# Patient Record
Sex: FEMALE | Race: WHITE | NOT HISPANIC OR LATINO | URBAN - METROPOLITAN AREA
[De-identification: names, ages, dates, MRNs, and addresses within clinical notes are randomized per-mention and may not be internally consistent; named-entity substitution may affect disease eponyms.]

---

## 2020-10-13 ENCOUNTER — TELEPHONE ENCOUNTER (OUTPATIENT)
Dept: URBAN - METROPOLITAN AREA CLINIC 92 | Facility: CLINIC | Age: 60
End: 2020-10-13

## 2020-10-13 NOTE — HPI-TODAY'S VISIT:
Dear Nat Stewart,  Oct mri:  Diffuse fatty infiltration of the liver seen without cirrhosis, portal hypertension or suspicious hepatic lesions. Specifically re the liver: A 0.7 x 0.9cm arterially enhancing focus in segment VII is similar to prior and most likely perfusional and will need redo scan in 6m. Calculated liver fat percentage is 18.49% and desired <6%. Regarding the gallbladder, Layering T2 hyperintense material within the gallbladder most likely represents biliary sludge. No inflammatory findings to suggest acute cholecystitis. Spleen and pancreas normal. renal cysts seen.  April mri with 22-23% fat and perfusional change seen on that scan also.   Dr Katz

## 2020-10-20 ENCOUNTER — TELEPHONE ENCOUNTER (OUTPATIENT)
Dept: URBAN - METROPOLITAN AREA CLINIC 92 | Facility: CLINIC | Age: 60
End: 2020-10-20

## 2020-10-20 ENCOUNTER — OFFICE VISIT (OUTPATIENT)
Dept: URBAN - METROPOLITAN AREA CLINIC 86 | Facility: CLINIC | Age: 60
End: 2020-10-20

## 2020-10-20 RX ORDER — ESCITALOPRAM 10 MG/1
TAKE 1 TABLET (10 MG) BY ORAL ROUTE ONCE DAILY TABLET, FILM COATED ORAL 1
Qty: 0 | Refills: 0 | Status: ACTIVE | COMMUNITY
Start: 1900-01-01 | End: 1900-01-01

## 2020-10-20 RX ORDER — FLUTICASONE PROPIONATE 50 UG/1
SPRAY 1 - 2 SPRAYS (50 - 100 MCG) IN EACH NOSTRIL BY INTRANASAL ROUTE ONCE DAILY AS NEEDED SPRAY, METERED NASAL 1
Qty: 1 | Refills: 0 | Status: ACTIVE | COMMUNITY
Start: 1900-01-01 | End: 1900-01-01

## 2020-10-20 RX ORDER — AZELASTINE 137 UG/1
SPRAY 2 SPRAYS IN EACH NOSTRIL BY INTRANASAL ROUTE 2 TIMES PER DAY AS DIRECTED SPRAY, METERED NASAL 2
Qty: 1 | Refills: 0 | Status: ACTIVE | COMMUNITY
Start: 1900-01-01 | End: 1900-01-01

## 2020-10-20 NOTE — HPI-OTHER HISTORIES
10/9/20 labs Gluc 110 Cr 0.78 Potassium 4.7 Tb 0.9  Alp 82 Ast 68 Alt elevated 93 Wbc 5.1 Hgb 12.7 Plt 204 Hemoglobin a1c 5.9%

## 2020-10-20 NOTE — HPI-TODAY'S VISIT:
The patient is a 59 year old /White female, who presents on referral from Mervat Price NP, for a gastroenterology evaluation for elevated liver enzymes and and fatty appearing and enlarged. A copy of this document will be sent to the referring provider.  The patient reports a personal history of no other habits that could cause liver damage.   10/13/20 mri:   * Final Report *  Reason For Exam JASSO  REPORT EXAM: MRI Abdomen w/ + w/o Contrast  CLINICAL INDICATION: JASSO. FATTY LIVER  TECHNIQUE: Multisequence, multiplanar MRI of the abdomen was performed without and with intravenous contrast. ESRC.2.7.3  CONTRAST: 14 cc of Prohance  COMPARISON: MR abdomen dated 4/1/2020  FINDINGS:  Lower Thorax: Normal.  Liver: Fat deposition. A 0.7 x 0.9cm arterially enhancing focus in segment VII is similar to prior and most likely perfusional (22:39).  Calculated fat percentage is 18.49%. R2 water is 29.08.  Gallbladder/Biliary Tree: Layering T2 hyperintense material within the gallbladder most likely represents biliary sludge. No inflammatory findings to suggest acute cholecystitis.  Spleen: Normal.  Pancreas: Normal.  Adrenal Glands: Normal.   Kidneys/Ureters: Renal cysts.  Gastrointestinal: Normal.   Lymph Nodes: Normal.  Vessels: Normal.  Peritoneum/Retroperitoneum: Normal.  Bones/Soft Tissues: Normal.  IMPRESSION:     Diffuse fatty infiltration of the liver without cirrhosis, portal hypertension or suspicious hepatic lesions.  The images were reviewed and interpreted by Emely Doty MD.    RECAP: Document Type: MRI Abdomen w/ + w/o Contrast Document Date: April 01, 2020 12:08  Document Status: Modified Document Title: MRI Abdomen w/ + w/o Contrast Performed By: Thomas Phipps  Verified By: Shakila Rodriguez on April 01, 2020 14:52  Encounter info: [id number], [facility], Single Visit OP, 4/1/2020 - 4/1/2020   * Final Report *  Reason For Exam hepatomegaly  REPORT EXAM: MRI Abdomen w/ + w/o Contrast  CLINICAL INDICATION: hepatomegaly.   TECHNIQUE: Multisequence, multiplanar MRI of the abdomen was performed without and with intravenous contrast. ESRC.2.7.3  CONTRAST: 14 cc of Prohance  COMPARISON: None  FINDINGS:  Lower Thorax: Normal.  Liver: Mild hepatomegaly. Hepatic steatosis with calculated fat signal fraction of 22.05-23.04%.  Small area of arterial enhancement in the posterior right hepatic lobe without correlate on other sequences is likely perfusional. No suspicious hepatic lesion.  Gallbladder/Biliary Tree: Gallbladder sludge or concentrated bile.  Spleen: Normal.  Pancreas: Normal.  Adrenal Glands: Normal.   Kidneys/Ureters: Renal cysts.  Gastrointestinal: Normal.   Lymph Nodes: Normal.  Vessels: Normal.  Peritoneum/Retroperitoneum: Normal.  Bones/Soft Tissues: Normal.  IMPRESSION:     Subjective mild hepatomegaly. Hepatic steatosis with calculated fat signal fraction of 22.05-23.04%. No suspicious hepatic lesion. No imaging findings of cirrhosis or portal venous hypertension.   The images were reviewed and interpreted by Shakila Rodriguez MD.  Signature Line *** Final ***  Electronically Signed By:  Shakila Rodriguez on  04/01/2020 14:52  Dictated by:  Thomas Phipps  3-10-20 glu 129 and cr 0.94 and na 142 and k 3.7 and cl 102 and co2 21 and ca 10.3 little up and alb 4.8 and tb 0.7 and alk 91 and ast 104 and alt 120  may want to mention to doctor re the calcium.  pt here for follow up for abnormal lfts and fatty liver.    She did her last mri at [facility] and areas of fatty sparing areas noted.   Prior started cbd oil 2 months prior and avoided.  Advised her that she can take apap but no more than 2gm daily.  She still has about 2 glasess of wine per month.   Oct 2019 gluc 100 ast 124 alt 137  Oct 2019 mri moderate fatty infiltration noted on liver. fatty sparing areas noted.    Recap:  She stopped meloxicam prior.  8/28/19 labs gluc 135 cr 0.77 alp 93 ast 75 alt 118 iron sat 24% alpha 1 MM need hep b vaccine and she has not been able to do yet. asma 6 camilo neg ceruloplamin 27.7 ferritin 136 hep b core ab neg need hep a vaccine also needed at some point. hep b core ab igm neg   She has not seen anyone else.   7-3-19 u/s: aorta and ivc not well seen. there was fatty infiltration of the liver seen and liver enlarged at 18cm. Pancreas not well seen and right kidney appears normal.  No gb wall thickening or pericholecystic fluid. cbd 5mm.  Pt bmi 27.3 with primary md and she has lost 7 pounds.  Amitryptiline those are old. Doxycycline was a short course for the ear. mirtazepine for 6m. The labs and their slow drop speak to fatty liver.  She has a hx mixed anx and depressive disorder and on meds for this.  June 2019 labs: tsh 3.21, chol 267 and trg 131 and hdl 46 and ldl 195 and glu 116 and cr 0.84 and na 143 and k 4.5 and cl 101 and co2 26 and ca 10.4 and alb 4.7 and tb 1.0 and alk 84 and ast 61 and alt 121 and wbc 6.7 and hg 13 and plat 214.  jan 2019 wbc 5.3 hg 12.6 and plat 215 and glu 110 and cr 0.95 and na 145 and k 4.5 and tb 0.9 and alk 85 and ast 62 and alt 81 and chol 188 abd trg 128 and ldl 121 and a1c 5.95.

## 2020-10-20 NOTE — HPI-OTHER HISTORIES
10/13/20 labs Gluc 110 Cr 0.78 Potassium 4.7 Tb 0.9  Alp 82 Ast 68 Alt elevated 93 Wbc 5.1 Hgb 12.7 Plt 204 Hemoglobin a1c 5.9%

## 2020-11-04 ENCOUNTER — LAB OUTSIDE AN ENCOUNTER (OUTPATIENT)
Dept: URBAN - METROPOLITAN AREA TELEHEALTH 2 | Facility: TELEHEALTH | Age: 60
End: 2020-11-04

## 2020-11-04 ENCOUNTER — OFFICE VISIT (OUTPATIENT)
Dept: URBAN - METROPOLITAN AREA TELEHEALTH 2 | Facility: TELEHEALTH | Age: 60
End: 2020-11-04
Payer: COMMERCIAL

## 2020-11-04 DIAGNOSIS — R16.0 HEPATOMEGALY: ICD-10-CM

## 2020-11-04 DIAGNOSIS — R73.09 ELEVATED HEMOGLOBIN A1C: ICD-10-CM

## 2020-11-04 DIAGNOSIS — E03.9 HYPOTHYROIDISM: ICD-10-CM

## 2020-11-04 DIAGNOSIS — F41.8 ANXIETY AND DEPRESSION: ICD-10-CM

## 2020-11-04 DIAGNOSIS — K76.0 FATTY LIVER: ICD-10-CM

## 2020-11-04 DIAGNOSIS — K75.81 NASH (NONALCOHOLIC STEATOHEPATITIS): ICD-10-CM

## 2020-11-04 DIAGNOSIS — K76.89 LIVER LESION: ICD-10-CM

## 2020-11-04 DIAGNOSIS — J30.1 ALLERGIC RHINITIS DUE TO POLLEN: ICD-10-CM

## 2020-11-04 DIAGNOSIS — I10 HTN (HYPERTENSION): ICD-10-CM

## 2020-11-04 DIAGNOSIS — E66.3 OVERWEIGHT: ICD-10-CM

## 2020-11-04 DIAGNOSIS — E78.5 HYPERLIPIDEMIA: ICD-10-CM

## 2020-11-04 DIAGNOSIS — G25.81 RESTLESS LEGS SYNDROME (RLS): ICD-10-CM

## 2020-11-04 PROCEDURE — G8427 DOCREV CUR MEDS BY ELIG CLIN: HCPCS

## 2020-11-04 PROCEDURE — 99214 OFFICE O/P EST MOD 30 MIN: CPT

## 2020-11-04 PROCEDURE — G8417 CALC BMI ABV UP PARAM F/U: HCPCS

## 2020-11-04 RX ORDER — ESCITALOPRAM 10 MG/1
TAKE 1 TABLET (10 MG) BY ORAL ROUTE ONCE DAILY TABLET, FILM COATED ORAL 1
Qty: 0 | Refills: 0 | Status: ON HOLD | COMMUNITY
Start: 1900-01-01

## 2020-11-04 RX ORDER — PAROXETINE HCL 10 MG
1 TABLET IN THE MORNING TABLET ORAL ONCE A DAY
Status: ACTIVE | COMMUNITY

## 2020-11-04 RX ORDER — METFORMIN HYDROCHLORIDE 500 MG/1
1 TABLET WITH A MEAL TABLET, FILM COATED ORAL ONCE A DAY
Status: ACTIVE | COMMUNITY

## 2020-11-04 RX ORDER — FLUTICASONE PROPIONATE 50 UG/1
SPRAY 1 - 2 SPRAYS (50 - 100 MCG) IN EACH NOSTRIL BY INTRANASAL ROUTE ONCE DAILY AS NEEDED SPRAY, METERED NASAL 1
Qty: 1 | Refills: 0 | Status: ACTIVE | COMMUNITY
Start: 1900-01-01

## 2020-11-04 RX ORDER — AZELASTINE 137 UG/1
SPRAY 2 SPRAYS IN EACH NOSTRIL BY INTRANASAL ROUTE 2 TIMES PER DAY AS DIRECTED SPRAY, METERED NASAL 2
Qty: 1 | Refills: 0 | Status: ACTIVE | COMMUNITY
Start: 1900-01-01

## 2020-11-04 NOTE — HPI-TODAY'S VISIT:
The patient is a 60 year old /White female, who presents on referral from Mervat Price NP, for a gastroenterology evaluation for elevated liver enzymes and and fatty appearing and enlarged. A copy of this document will be sent to the referring provider.   The patient reports a personal history of no other habits that could cause liver damage.   pt has been working on her weight.  was started on metformin a few months ago and gluc has improved as well as fat quant on mri and lfts.  she is continuing to work on this.   10/9/20 labs  Cr 0.78 Gluc 110 Alp 82 Ast 68 Alt 93 Wbc 5.1 Hgb 12.7 Plt 204   10/13/20 mri:   * Final Report *  Reason For Exam JASSO  REPORT EXAM: MRI Abdomen w/ + w/o Contrast  CLINICAL INDICATION: JASSO. FATTY LIVER  TECHNIQUE: Multisequence, multiplanar MRI of the abdomen was performed without and with intravenous contrast. ESRC.2.7.3  CONTRAST: 14 cc of Prohance  COMPARISON: MR abdomen dated 4/1/2020  FINDINGS:  Lower Thorax: Normal.  Liver: Fat deposition. A 0.7 x 0.9cm arterially enhancing focus in segment VII is similar to prior and most likely perfusional (22:39).  Calculated fat percentage is 18.49%. R2 water is 29.08.  Gallbladder/Biliary Tree: Layering T2 hyperintense material within the gallbladder most likely represents biliary sludge. No inflammatory findings to suggest acute cholecystitis.  Spleen: Normal.  Pancreas: Normal.  Adrenal Glands: Normal.   Kidneys/Ureters: Renal cysts.  Gastrointestinal: Normal.   Lymph Nodes: Normal.  Vessels: Normal.  Peritoneum/Retroperitoneum: Normal.  Bones/Soft Tissues: Normal.  IMPRESSION:     Diffuse fatty infiltration of the liver without cirrhosis, portal hypertension or suspicious hepatic lesions.  The images were reviewed and interpreted by Emely Doty MD.   PREVIOUS NOTE: Document Type: MRI Abdomen w/ + w/o Contrast Document Date: April 01, 2020 12:08  Document Status: Modified Document Title: MRI Abdomen w/ + w/o Contrast Performed By: Thomas Phipps  Verified By: Shakila Rodriguez on April 01, 2020 14:52  Encounter info: [id number], [facility], Single Visit OP, 4/1/2020 - 4/1/2020   * Final Report *  Reason For Exam hepatomegaly  REPORT EXAM: MRI Abdomen w/ + w/o Contrast  CLINICAL INDICATION: hepatomegaly.   TECHNIQUE: Multisequence, multiplanar MRI of the abdomen was performed without and with intravenous contrast. ESRC.2.7.3  CONTRAST: 14 cc of Prohance  COMPARISON: None  FINDINGS:  Lower Thorax: Normal.  Liver: Mild hepatomegaly. Hepatic steatosis with calculated fat signal fraction of 22.05-23.04%.  Small area of arterial enhancement in the posterior right hepatic lobe without correlate on other sequences is likely perfusional. No suspicious hepatic lesion.  Gallbladder/Biliary Tree: Gallbladder sludge or concentrated bile.  Spleen: Normal.  Pancreas: Normal.  Adrenal Glands: Normal.   Kidneys/Ureters: Renal cysts.  Gastrointestinal: Normal.   Lymph Nodes: Normal.  Vessels: Normal.  Peritoneum/Retroperitoneum: Normal.  Bones/Soft Tissues: Normal.  IMPRESSION:     Subjective mild hepatomegaly. Hepatic steatosis with calculated fat signal fraction of 22.05-23.04%. No suspicious hepatic lesion. No imaging findings of cirrhosis or portal venous hypertension.   The images were reviewed and interpreted by Shakila Rodriguez MD.  Signature Line *** Final ***  Electronically Signed By:  Shakila Rodriguez on  04/01/2020 14:52  Dictated by:  Thomas Phipps I  3-10-20 glu 129 and cr 0.94 and na 142 and k 3.7 and cl 102 and co2 21 and ca 10.3 little up and alb 4.8 and tb 0.7 and alk 91 and ast 104 and alt 120

## 2021-03-31 ENCOUNTER — TELEPHONE ENCOUNTER (OUTPATIENT)
Dept: URBAN - METROPOLITAN AREA CLINIC 92 | Facility: CLINIC | Age: 61
End: 2021-03-31

## 2021-03-31 NOTE — HPI-TODAY'S VISIT:
Deasarah Stewart,  March 30 mri: Hepatic steatosis (fatty liver) with a calculated fat percentage of approximately 23% and desired less than 6%. No morphologic findings of cirrhosis. No suspicious hepatic lesions. Scattered tiny foci of arterial enhancement, without correlate on other sequences, most likely perfusional.  Small gallbladder sludge, without cholelithiasis or findings of acute cholecystitis. Spleen and pancreas and kidneys normal.  Dr Katz

## 2021-04-14 ENCOUNTER — OFFICE VISIT (OUTPATIENT)
Dept: URBAN - METROPOLITAN AREA TELEHEALTH 2 | Facility: TELEHEALTH | Age: 61
End: 2021-04-14

## 2021-04-14 RX ORDER — FLUTICASONE PROPIONATE 50 UG/1
SPRAY 1 - 2 SPRAYS (50 - 100 MCG) IN EACH NOSTRIL BY INTRANASAL ROUTE ONCE DAILY AS NEEDED SPRAY, METERED NASAL 1
Qty: 1 | Refills: 0 | Status: ACTIVE | COMMUNITY
Start: 1900-01-01

## 2021-04-14 RX ORDER — METFORMIN HYDROCHLORIDE 500 MG/1
1 TABLET WITH A MEAL TABLET, FILM COATED ORAL ONCE A DAY
Status: ACTIVE | COMMUNITY

## 2021-04-14 RX ORDER — PAROXETINE HCL 10 MG
1 TABLET IN THE MORNING TABLET ORAL ONCE A DAY
Status: ACTIVE | COMMUNITY

## 2021-04-14 RX ORDER — AZELASTINE 137 UG/1
SPRAY 2 SPRAYS IN EACH NOSTRIL BY INTRANASAL ROUTE 2 TIMES PER DAY AS DIRECTED SPRAY, METERED NASAL 2
Qty: 1 | Refills: 0 | Status: ACTIVE | COMMUNITY
Start: 1900-01-01

## 2021-04-14 RX ORDER — ESCITALOPRAM 10 MG/1
TAKE 1 TABLET (10 MG) BY ORAL ROUTE ONCE DAILY TABLET, FILM COATED ORAL 1
Qty: 0 | Refills: 0 | Status: ON HOLD | COMMUNITY
Start: 1900-01-01

## 2021-04-14 NOTE — HPI-TODAY'S VISIT:
RESCHEDULING APPT UNTIL AFTER LABS ARE DONE WITH PCP SO WE HAVE ALL INFO PRIOR TO APPT   March 30 mri: Hepatic steatosis (fatty liver) with a calculated fat percentage of approximately 23% and desired less than 6%. No morphologic findings of cirrhosis. No suspicious hepatic lesions. Scattered tiny foci of arterial enhancement, without correlate on other sequences, most likely perfusional.  Small gallbladder sludge, without cholelithiasis or findings of acute cholecystitis. Spleen and pancreas and kidneys normal.  Dr Katz The patient is a 60 year old /White female, who presents on referral from Mervat Price NP, for a gastroenterology evaluation for elevated liver enzymes and and fatty appearing and enlarged. A copy of this document will be sent to the referring provider.   The patient reports a personal history of no other habits that could cause liver damage.   pt has been working on her weight.  was started on metformin a few months ago and gluc has improved as well as fat quant on mri and lfts.  she is continuing to work on this.   10/9/20 labs  Cr 0.78 Gluc 110 Alp 82 Ast 68 Alt 93 Wbc 5.1 Hgb 12.7 Plt 204   10/13/20 mri:   * Final Report *  Reason For Exam JASSO  REPORT EXAM: MRI Abdomen w/ + w/o Contrast  CLINICAL INDICATION: JASSO. FATTY LIVER  TECHNIQUE: Multisequence, multiplanar MRI of the abdomen was performed without and with intravenous contrast. ESRC.2.7.3  CONTRAST: 14 cc of Prohance  COMPARISON: MR abdomen dated 4/1/2020  FINDINGS:  Lower Thorax: Normal.  Liver: Fat deposition. A 0.7 x 0.9cm arterially enhancing focus in segment VII is similar to prior and most likely perfusional (22:39).  Calculated fat percentage is 18.49%. R2 water is 29.08.  Gallbladder/Biliary Tree: Layering T2 hyperintense material within the gallbladder most likely represents biliary sludge. No inflammatory findings to suggest acute cholecystitis.  Spleen: Normal.  Pancreas: Normal.  Adrenal Glands: Normal.   Kidneys/Ureters: Renal cysts.  Gastrointestinal: Normal.   Lymph Nodes: Normal.  Vessels: Normal.  Peritoneum/Retroperitoneum: Normal.  Bones/Soft Tissues: Normal.  IMPRESSION:     Diffuse fatty infiltration of the liver without cirrhosis, portal hypertension or suspicious hepatic lesions.  The images were reviewed and interpreted by Emely Doty MD.   PREVIOUS NOTE: Document Type: MRI Abdomen w/ + w/o Contrast Document Date: April 01, 2020 12:08  Document Status: Modified Document Title: MRI Abdomen w/ + w/o Contrast Performed By: Thomas Phipps  Verified By: Shakila Rodriguez on April 01, 2020 14:52  Encounter info: [id number], [facility], Single Visit OP, 4/1/2020 - 4/1/2020   * Final Report *  Reason For Exam hepatomegaly  REPORT EXAM: MRI Abdomen w/ + w/o Contrast  CLINICAL INDICATION: hepatomegaly.   TECHNIQUE: Multisequence, multiplanar MRI of the abdomen was performed without and with intravenous contrast. ESRC.2.7.3  CONTRAST: 14 cc of Prohance  COMPARISON: None  FINDINGS:  Lower Thorax: Normal.  Liver: Mild hepatomegaly. Hepatic steatosis with calculated fat signal fraction of 22.05-23.04%.  Small area of arterial enhancement in the posterior right hepatic lobe without correlate on other sequences is likely perfusional. No suspicious hepatic lesion.  Gallbladder/Biliary Tree: Gallbladder sludge or concentrated bile.  Spleen: Normal.  Pancreas: Normal.  Adrenal Glands: Normal.   Kidneys/Ureters: Renal cysts.  Gastrointestinal: Normal.   Lymph Nodes: Normal.  Vessels: Normal.  Peritoneum/Retroperitoneum: Normal.  Bones/Soft Tissues: Normal.  IMPRESSION:     Subjective mild hepatomegaly. Hepatic steatosis with calculated fat signal fraction of 22.05-23.04%. No suspicious hepatic lesion. No imaging findings of cirrhosis or portal venous hypertension.   The images were reviewed and interpreted by Shakila Rodriguez MD.  Signature Line *** Final ***  Electronically Signed By:  Shakila Rodriguez on  04/01/2020 14:52  Dictated by:  Thomas Phipps  3-10-20 glu 129 and cr 0.94 and na 142 and k 3.7 and cl 102 and co2 21 and ca 10.3 little up and alb 4.8 and tb 0.7 and alk 91 and ast 104 and alt 120

## 2021-05-20 ENCOUNTER — OFFICE VISIT (OUTPATIENT)
Dept: URBAN - METROPOLITAN AREA TELEHEALTH 2 | Facility: TELEHEALTH | Age: 61
End: 2021-05-20

## 2021-06-28 PROBLEM — 300331000 LESION OF LIVER: Status: ACTIVE | Noted: 2020-11-04

## 2021-06-29 ENCOUNTER — OFFICE VISIT (OUTPATIENT)
Dept: URBAN - METROPOLITAN AREA CLINIC 86 | Facility: CLINIC | Age: 61
End: 2021-06-29

## 2021-06-29 RX ORDER — AZELASTINE 137 UG/1
SPRAY 2 SPRAYS IN EACH NOSTRIL BY INTRANASAL ROUTE 2 TIMES PER DAY AS DIRECTED SPRAY, METERED NASAL 2
Qty: 1 | Refills: 0 | Status: ACTIVE | COMMUNITY
Start: 1900-01-01

## 2021-06-29 RX ORDER — METFORMIN HYDROCHLORIDE 500 MG/1
1 TABLET WITH A MEAL TABLET, FILM COATED ORAL ONCE A DAY
Status: ACTIVE | COMMUNITY

## 2021-06-29 RX ORDER — ESCITALOPRAM 10 MG/1
TAKE 1 TABLET (10 MG) BY ORAL ROUTE ONCE DAILY TABLET, FILM COATED ORAL 1
Qty: 0 | Refills: 0 | COMMUNITY
Start: 1900-01-01

## 2021-06-29 RX ORDER — FLUTICASONE PROPIONATE 50 UG/1
SPRAY 1 - 2 SPRAYS (50 - 100 MCG) IN EACH NOSTRIL BY INTRANASAL ROUTE ONCE DAILY AS NEEDED SPRAY, METERED NASAL 1
Qty: 1 | Refills: 0 | Status: ACTIVE | COMMUNITY
Start: 1900-01-01

## 2021-06-29 RX ORDER — PAROXETINE HCL 10 MG
1 TABLET IN THE MORNING TABLET ORAL ONCE A DAY
Status: ACTIVE | COMMUNITY

## 2021-06-29 NOTE — HPI-TODAY'S VISIT:
The patient is a 60 year old /White female, who presents on referral from Mervat Price NP, for a gastroenterology evaluation for elevated liver enzymes and and fatty appearing and enlarged. A copy of this document will be sent to the referring provider.  The patient reports a personal history of no other habits that could cause liver damage.   Labs-   March 30 mri: Hepatic steatosis (fatty liver) with a calculated fat percentage of approximately 23% and desired less than 6%. No morphologic findings of cirrhosis. No suspicious hepatic lesions. Scattered tiny foci of arterial enhancement, without correlate on other sequences, most likely perfusional.  Small gallbladder sludge, without cholelithiasis or findings of acute cholecystitis. Spleen and pancreas and kidneys normal.     pt has been working on her weight.  was started on metformin a few months ago and gluc has improved as well as fat quant on mri and lfts.  she is continuing to work on this.   10/9/20 labs  Cr 0.78 Gluc 110 Alp 82 Ast 68 Alt 93 Wbc 5.1 Hgb 12.7 Plt 204 Hemoglobin a1c 5.9%   10/13/20 mri * Final Report *  Reason For Exam JASSO  REPORT EXAM: MRI Abdomen w/ + w/o Contrast  CLINICAL INDICATION: JASSO. FATTY LIVER  TECHNIQUE: Multisequence, multiplanar MRI of the abdomen was performed without and with intravenous contrast. ESRC.2.7.3  CONTRAST: 14 cc of Prohance  COMPARISON: MR abdomen dated 4/1/2020  FINDINGS:  Lower Thorax: Normal.  Liver: Fat deposition. A 0.7 x 0.9cm arterially enhancing focus in segment VII is similar to prior and most likely perfusional (22:39).  Calculated fat percentage is 18.49%. R2 water is 29.08.  Gallbladder/Biliary Tree: Layering T2 hyperintense material within the gallbladder most likely represents biliary sludge. No inflammatory findings to suggest acute cholecystitis.  Spleen: Normal.  Pancreas: Normal.  Adrenal Glands: Normal.   Kidneys/Ureters: Renal cysts.  Gastrointestinal: Normal.   Lymph Nodes: Normal.  Vessels: Normal.  Peritoneum/Retroperitoneum: Normal.  Bones/Soft Tissues: Normal.  IMPRESSION:     Diffuse fatty infiltration of the liver without cirrhosis, portal hypertension or suspicious hepatic lesions.  The images were reviewed and interpreted by Emely Doty MD.   PREVIOUS NOTE: Document Type: MRI Abdomen w/ + w/o Contrast Document Date: April 01, 2020 12:08  Document Status: Modified Document Title: MRI Abdomen w/ + w/o Contrast Performed By: Thomas Phipps  Verified By: Shakila Rodriguez on April 01, 2020 14:52  Encounter info: [id number], [facility], Single Visit OP, 4/1/2020 - 4/1/2020   * Final Report *  Reason For Exam hepatomegaly  REPORT EXAM: MRI Abdomen w/ + w/o Contrast  CLINICAL INDICATION: hepatomegaly.   TECHNIQUE: Multisequence, multiplanar MRI of the abdomen was performed without and with intravenous contrast. ESRC.2.7.3  CONTRAST: 14 cc of Prohance  COMPARISON: None  FINDINGS:  Lower Thorax: Normal.  Liver: Mild hepatomegaly. Hepatic steatosis with calculated fat signal fraction of 22.05-23.04%.  Small area of arterial enhancement in the posterior right hepatic lobe without correlate on other sequences is likely perfusional. No suspicious hepatic lesion.  Gallbladder/Biliary Tree: Gallbladder sludge or concentrated bile.  Spleen: Normal.  Pancreas: Normal.  Adrenal Glands: Normal.   Kidneys/Ureters: Renal cysts.  Gastrointestinal: Normal.   Lymph Nodes: Normal.  Vessels: Normal.  Peritoneum/Retroperitoneum: Normal.  Bones/Soft Tissues: Normal.  IMPRESSION:     Subjective mild hepatomegaly. Hepatic steatosis with calculated fat signal fraction of 22.05-23.04%. No suspicious hepatic lesion. No imaging findings of cirrhosis or portal venous hypertension.   The images were reviewed and interpreted by Shakila Rodriguez MD.  Signature Line *** Final ***  Electronically Signed By:  Shakila Rodriguez on  04/01/2020 14:52  Dictated by:  Thomas Phipps  3-10-20 glu 129 and cr 0.94 and na 142 and k 3.7 and cl 102 and co2 21 and ca 10.3 little up and alb 4.8 and tb 0.7 and alk 91 and ast 104 and alt 120

## 2021-09-07 ENCOUNTER — LAB OUTSIDE AN ENCOUNTER (OUTPATIENT)
Dept: URBAN - METROPOLITAN AREA CLINIC 70 | Facility: CLINIC | Age: 61
End: 2021-09-07

## 2021-09-07 ENCOUNTER — WEB ENCOUNTER (OUTPATIENT)
Dept: URBAN - METROPOLITAN AREA CLINIC 70 | Facility: CLINIC | Age: 61
End: 2021-09-07

## 2021-09-07 ENCOUNTER — OFFICE VISIT (OUTPATIENT)
Dept: URBAN - METROPOLITAN AREA CLINIC 70 | Facility: CLINIC | Age: 61
End: 2021-09-07
Payer: COMMERCIAL

## 2021-09-07 ENCOUNTER — DASHBOARD ENCOUNTERS (OUTPATIENT)
Age: 61
End: 2021-09-07

## 2021-09-07 DIAGNOSIS — K59.04 CHRONIC IDIOPATHIC CONSTIPATION: ICD-10-CM

## 2021-09-07 DIAGNOSIS — R79.89 ELEVATED LFTS: ICD-10-CM

## 2021-09-07 DIAGNOSIS — K76.0 FATTY LIVER: ICD-10-CM

## 2021-09-07 DIAGNOSIS — Z12.11 COLON CANCER SCREENING: ICD-10-CM

## 2021-09-07 PROBLEM — 82934008: Status: ACTIVE | Noted: 2021-09-07

## 2021-09-07 PROCEDURE — 99214 OFFICE O/P EST MOD 30 MIN: CPT | Performed by: INTERNAL MEDICINE

## 2021-09-07 RX ORDER — ESCITALOPRAM 10 MG/1
TAKE 1 TABLET (10 MG) BY ORAL ROUTE ONCE DAILY TABLET, FILM COATED ORAL 1
Qty: 0 | Refills: 0 | Status: ACTIVE | COMMUNITY
Start: 1900-01-01

## 2021-09-07 RX ORDER — BISACODYL 5 MG
1 TABLET AS NEEDED TABLET, DELAYED RELEASE (ENTERIC COATED) ORAL ONCE A DAY
Status: ACTIVE | COMMUNITY

## 2021-09-07 RX ORDER — AZELASTINE 137 UG/1
SPRAY 2 SPRAYS IN EACH NOSTRIL BY INTRANASAL ROUTE 2 TIMES PER DAY AS DIRECTED SPRAY, METERED NASAL 2
Qty: 1 | Refills: 0 | Status: ACTIVE | COMMUNITY
Start: 1900-01-01

## 2021-09-07 RX ORDER — METFORMIN HYDROCHLORIDE 500 MG/1
1 TABLET WITH A MEAL TABLET, FILM COATED ORAL ONCE A DAY
Status: ACTIVE | COMMUNITY

## 2021-09-07 RX ORDER — PAROXETINE HCL 10 MG
1 TABLET IN THE MORNING TABLET ORAL ONCE A DAY
Status: DISCONTINUED | COMMUNITY

## 2021-09-07 RX ORDER — LINACLOTIDE 145 UG/1
1 CAPSULE AT LEAST 30 MINUTES BEFORE THE FIRST MEAL OF THE DAY ON AN EMPTY STOMACH CAPSULE, GELATIN COATED ORAL ONCE A DAY
Qty: 90 | Refills: 3 | OUTPATIENT
Start: 2021-09-07 | End: 2022-09-02

## 2021-09-07 RX ORDER — ROSUVASTATIN CALCIUM 20 MG/1
1 TABLET TABLET, FILM COATED ORAL ONCE A DAY
Status: ACTIVE | COMMUNITY

## 2021-09-07 RX ORDER — FLUTICASONE PROPIONATE 50 UG/1
SPRAY 1 - 2 SPRAYS (50 - 100 MCG) IN EACH NOSTRIL BY INTRANASAL ROUTE ONCE DAILY AS NEEDED SPRAY, METERED NASAL 1
Qty: 1 | Refills: 0 | Status: DISCONTINUED | COMMUNITY
Start: 1900-01-01

## 2021-09-07 RX ORDER — PSYLLIUM SEED (WITH DEXTROSE)
AS DIRECTED POWDER (GRAM) ORAL
Status: ACTIVE | COMMUNITY

## 2021-09-07 NOTE — HPI-TODAY'S VISIT:
Note from OV 11/4/20 with PADILLA Benjamin: The patient is a 60 year old /White female, who presents on referral from Mervat Price NP, for a gastroenterology evaluation for elevated liver enzymes and and fatty appearing and enlarged. A copy of this document will be sent to the referring provider.  The patient reports a personal history of no other habits that could cause liver damage.   Labs-   March 30 mri: Hepatic steatosis (fatty liver) with a calculated fat percentage of approximately 23% and desired less than 6%. No morphologic findings of cirrhosis. No suspicious hepatic lesions. Scattered tiny foci of arterial enhancement, without correlate on other sequences, most likely perfusional.  Small gallbladder sludge, without cholelithiasis or findings of acute cholecystitis. Spleen and pancreas and kidneys normal.     pt has been working on her weight.  was started on metformin a few months ago and gluc has improved as well as fat quant on mri and lfts.  she is continuing to work on this.   10/9/20 labs  Cr 0.78 Gluc 110 Alp 82 Ast 68 Alt 93 Wbc 5.1 Hgb 12.7 Plt 204 Hemoglobin a1c 5.9%   10/13/20 mri * Final Report *  Reason For Exam JASSO  REPORT EXAM: MRI Abdomen w/ + w/o Contrast  CLINICAL INDICATION: JASSO. FATTY LIVER  TECHNIQUE: Multisequence, multiplanar MRI of the abdomen was performed without and with intravenous contrast. ESRC.2.7.3  CONTRAST: 14 cc of Prohance  COMPARISON: MR abdomen dated 4/1/2020  FINDINGS:  Lower Thorax: Normal.  Liver: Fat deposition. A 0.7 x 0.9cm arterially enhancing focus in segment VII is similar to prior and most likely perfusional (22:39).  Calculated fat percentage is 18.49%. R2 water is 29.08.  Gallbladder/Biliary Tree: Layering T2 hyperintense material within the gallbladder most likely represents biliary sludge. No inflammatory findings to suggest acute cholecystitis.  Spleen: Normal.  Pancreas: Normal.  Adrenal Glands: Normal.   Kidneys/Ureters: Renal cysts.  Gastrointestinal: Normal.   Lymph Nodes: Normal.  Vessels: Normal.  Peritoneum/Retroperitoneum: Normal.  Bones/Soft Tissues: Normal.  IMPRESSION:     Diffuse fatty infiltration of the liver without cirrhosis, portal hypertension or suspicious hepatic lesions.  The images were reviewed and interpreted by Emely Doty MD.   PREVIOUS NOTE: Document Type: MRI Abdomen w/ + w/o Contrast Document Date: April 01, 2020 12:08  Document Status: Modified Document Title: MRI Abdomen w/ + w/o Contrast Performed By: Thomas Phipps  Verified By: Shakila Rodriguez on April 01, 2020 14:52  Encounter info: [id number], [facility], Single Visit OP, 4/1/2020 - 4/1/2020   * Final Report *  Reason For Exam hepatomegaly  REPORT EXAM: MRI Abdomen w/ + w/o Contrast  CLINICAL INDICATION: hepatomegaly.   TECHNIQUE: Multisequence, multiplanar MRI of the abdomen was performed without and with intravenous contrast. ESRC.2.7.3  CONTRAST: 14 cc of Prohance  COMPARISON: None  FINDINGS:  Lower Thorax: Normal.  Liver: Mild hepatomegaly. Hepatic steatosis with calculated fat signal fraction of 22.05-23.04%.  Small area of arterial enhancement in the posterior right hepatic lobe without correlate on other sequences is likely perfusional. No suspicious hepatic lesion.  Gallbladder/Biliary Tree: Gallbladder sludge or concentrated bile.  Spleen: Normal.  Pancreas: Normal.  Adrenal Glands: Normal.   Kidneys/Ureters: Renal cysts.  Gastrointestinal: Normal.   Lymph Nodes: Normal.  Vessels: Normal.  Peritoneum/Retroperitoneum: Normal.  Bones/Soft Tissues: Normal.  IMPRESSION:     Subjective mild hepatomegaly. Hepatic steatosis with calculated fat signal fraction of 22.05-23.04%. No suspicious hepatic lesion. No imaging findings of cirrhosis or portal venous hypertension.   The images were reviewed and interpreted by Shakila Rodriguez MD.  Signature Line *** Final ***  Electronically Signed By:  Shakila Rodriguez on  04/01/2020 14:52  Dictated by:  Thomas Phipps  3-10-20 glu 129 and cr 0.94 and na 142 and k 3.7 and cl 102 and co2 21 and ca 10.3 little up and alb 4.8 and tb 0.7 and alk 91 and ast 104 and alt 120 ----------------------------- TODAY: Pt has a hx of fatty liver. [facility] and has labs and imaging every 6 months. MRI in March showed stable finding of fatty liver with no evidence of cirrhosis.  She presents today with c/o constipation. BM every 2-3 days. No improvement with fiber or Miralax. Last colonoscopy was 10 years ago.

## 2021-09-09 LAB
ALBUMIN: 4.8
ALKALINE PHOSPHATASE: 100
ALT (SGPT): 45
AST (SGOT): 50
BILIRUBIN, DIRECT: 0.34
BILIRUBIN, TOTAL: 1.1
PROTEIN, TOTAL: 7

## 2021-11-10 ENCOUNTER — OFFICE VISIT (OUTPATIENT)
Dept: URBAN - METROPOLITAN AREA SURGERY CENTER 24 | Facility: SURGERY CENTER | Age: 61
End: 2021-11-10
Payer: COMMERCIAL

## 2021-11-10 DIAGNOSIS — D12.2 ADENOMA OF ASCENDING COLON: ICD-10-CM

## 2021-11-10 PROCEDURE — G8907 PT DOC NO EVENTS ON DISCHARG: HCPCS | Performed by: INTERNAL MEDICINE

## 2021-11-10 PROCEDURE — 45385 COLONOSCOPY W/LESION REMOVAL: CPT | Performed by: INTERNAL MEDICINE

## 2021-11-10 PROCEDURE — 45380 COLONOSCOPY AND BIOPSY: CPT | Performed by: INTERNAL MEDICINE

## 2021-11-10 RX ORDER — ROSUVASTATIN CALCIUM 20 MG/1
1 TABLET TABLET, FILM COATED ORAL ONCE A DAY
Status: ACTIVE | COMMUNITY

## 2021-11-10 RX ORDER — METFORMIN HYDROCHLORIDE 500 MG/1
1 TABLET WITH A MEAL TABLET, FILM COATED ORAL ONCE A DAY
Status: ACTIVE | COMMUNITY

## 2021-11-10 RX ORDER — BISACODYL 5 MG
1 TABLET AS NEEDED TABLET, DELAYED RELEASE (ENTERIC COATED) ORAL ONCE A DAY
Status: ACTIVE | COMMUNITY

## 2021-11-10 RX ORDER — AZELASTINE 137 UG/1
SPRAY 2 SPRAYS IN EACH NOSTRIL BY INTRANASAL ROUTE 2 TIMES PER DAY AS DIRECTED SPRAY, METERED NASAL 2
Qty: 1 | Refills: 0 | Status: ACTIVE | COMMUNITY
Start: 1900-01-01

## 2021-11-10 RX ORDER — PSYLLIUM SEED (WITH DEXTROSE)
AS DIRECTED POWDER (GRAM) ORAL
Status: ACTIVE | COMMUNITY

## 2021-11-10 RX ORDER — LINACLOTIDE 145 UG/1
1 CAPSULE AT LEAST 30 MINUTES BEFORE THE FIRST MEAL OF THE DAY ON AN EMPTY STOMACH CAPSULE, GELATIN COATED ORAL ONCE A DAY
Qty: 90 | Refills: 3 | Status: ACTIVE | COMMUNITY
Start: 2021-09-07 | End: 2022-09-02

## 2021-11-10 RX ORDER — ESCITALOPRAM 10 MG/1
TAKE 1 TABLET (10 MG) BY ORAL ROUTE ONCE DAILY TABLET, FILM COATED ORAL 1
Qty: 0 | Refills: 0 | Status: ACTIVE | COMMUNITY
Start: 1900-01-01